# Patient Record
Sex: MALE | Race: ASIAN | NOT HISPANIC OR LATINO | Employment: OTHER | URBAN - METROPOLITAN AREA
[De-identification: names, ages, dates, MRNs, and addresses within clinical notes are randomized per-mention and may not be internally consistent; named-entity substitution may affect disease eponyms.]

---

## 2020-02-05 ENCOUNTER — OFFICE VISIT - HEALTHEAST (OUTPATIENT)
Dept: FAMILY MEDICINE | Facility: CLINIC | Age: 30
End: 2020-02-05

## 2020-02-05 DIAGNOSIS — J11.1 INFLUENZA-LIKE ILLNESS: ICD-10-CM

## 2020-02-05 LAB
DEPRECATED S PYO AG THROAT QL EIA: NORMAL
FLUAV AG SPEC QL IA: NORMAL
FLUBV AG SPEC QL IA: NORMAL

## 2020-02-06 LAB — GROUP A STREP BY PCR: NORMAL

## 2020-02-24 ENCOUNTER — OFFICE VISIT - HEALTHEAST (OUTPATIENT)
Dept: FAMILY MEDICINE | Facility: CLINIC | Age: 30
End: 2020-02-24

## 2020-02-24 DIAGNOSIS — Z00.00 ROUTINE GENERAL MEDICAL EXAMINATION AT A HEALTH CARE FACILITY: ICD-10-CM

## 2020-02-24 DIAGNOSIS — Z11.59 SCREENING FOR VIRAL DISEASE: ICD-10-CM

## 2020-02-24 DIAGNOSIS — H54.3 DECREASED VISION IN BOTH EYES: ICD-10-CM

## 2020-02-24 DIAGNOSIS — Z23 IMMUNIZATION DUE: ICD-10-CM

## 2020-02-24 DIAGNOSIS — B07.9 VIRAL WARTS, UNSPECIFIED TYPE: ICD-10-CM

## 2020-02-24 DIAGNOSIS — H02.402 PTOSIS OF LEFT EYELID: ICD-10-CM

## 2020-02-24 LAB
ALBUMIN SERPL-MCNC: 4.5 G/DL (ref 3.5–5)
ALP SERPL-CCNC: 85 U/L (ref 45–120)
ALT SERPL W P-5'-P-CCNC: 53 U/L (ref 0–45)
ANION GAP SERPL CALCULATED.3IONS-SCNC: 12 MMOL/L (ref 5–18)
AST SERPL W P-5'-P-CCNC: 30 U/L (ref 0–40)
BILIRUB SERPL-MCNC: 0.6 MG/DL (ref 0–1)
BUN SERPL-MCNC: 18 MG/DL (ref 8–22)
CALCIUM SERPL-MCNC: 9.6 MG/DL (ref 8.5–10.5)
CHLORIDE BLD-SCNC: 102 MMOL/L (ref 98–107)
CHOLEST SERPL-MCNC: 161 MG/DL
CO2 SERPL-SCNC: 26 MMOL/L (ref 22–31)
CREAT SERPL-MCNC: 0.89 MG/DL (ref 0.7–1.3)
ERYTHROCYTE [DISTWIDTH] IN BLOOD BY AUTOMATED COUNT: 12 % (ref 11–14.5)
FASTING STATUS PATIENT QL REPORTED: NO
GFR SERPL CREATININE-BSD FRML MDRD: >60 ML/MIN/1.73M2
GLUCOSE BLD-MCNC: 96 MG/DL (ref 70–125)
HCT VFR BLD AUTO: 47.4 % (ref 40–54)
HDLC SERPL-MCNC: 44 MG/DL
HGB BLD-MCNC: 16.1 G/DL (ref 14–18)
HIV 1+2 AB+HIV1 P24 AG SERPL QL IA: NEGATIVE
LDLC SERPL CALC-MCNC: 96 MG/DL
MCH RBC QN AUTO: 29.2 PG (ref 27–34)
MCHC RBC AUTO-ENTMCNC: 34 G/DL (ref 32–36)
MCV RBC AUTO: 86 FL (ref 80–100)
PLATELET # BLD AUTO: 271 THOU/UL (ref 140–440)
PMV BLD AUTO: 8.2 FL (ref 7–10)
POTASSIUM BLD-SCNC: 4 MMOL/L (ref 3.5–5)
PROT SERPL-MCNC: 7.5 G/DL (ref 6–8)
RBC # BLD AUTO: 5.53 MILL/UL (ref 4.4–6.2)
SODIUM SERPL-SCNC: 140 MMOL/L (ref 136–145)
TRIGL SERPL-MCNC: 103 MG/DL
TSH SERPL DL<=0.005 MIU/L-ACNC: 1 UIU/ML (ref 0.3–5)
WBC: 6.5 THOU/UL (ref 4–11)

## 2020-02-24 ASSESSMENT — MIFFLIN-ST. JEOR: SCORE: 1687.57

## 2020-02-27 ENCOUNTER — COMMUNICATION - HEALTHEAST (OUTPATIENT)
Dept: FAMILY MEDICINE | Facility: CLINIC | Age: 30
End: 2020-02-27

## 2020-05-27 ENCOUNTER — OFFICE VISIT - HEALTHEAST (OUTPATIENT)
Dept: FAMILY MEDICINE | Facility: CLINIC | Age: 30
End: 2020-05-27

## 2020-05-27 DIAGNOSIS — R74.01 ELEVATED ALT MEASUREMENT: ICD-10-CM

## 2020-06-03 ENCOUNTER — AMBULATORY - HEALTHEAST (OUTPATIENT)
Dept: LAB | Facility: CLINIC | Age: 30
End: 2020-06-03

## 2020-06-03 DIAGNOSIS — R74.01 ELEVATED ALT MEASUREMENT: ICD-10-CM

## 2020-06-03 LAB
AFP SERPL-MCNC: 2.9 UG/ML
ALBUMIN SERPL-MCNC: 4.5 G/DL (ref 3.5–5)
ALP SERPL-CCNC: 76 U/L (ref 45–120)
ALT SERPL W P-5'-P-CCNC: 36 U/L (ref 0–45)
AST SERPL W P-5'-P-CCNC: 27 U/L (ref 0–40)
BILIRUB DIRECT SERPL-MCNC: 0.2 MG/DL
BILIRUB SERPL-MCNC: 0.6 MG/DL (ref 0–1)
FERRITIN SERPL-MCNC: 350 NG/ML (ref 27–300)
PROT SERPL-MCNC: 7.9 G/DL (ref 6–8)

## 2020-06-04 ENCOUNTER — COMMUNICATION - HEALTHEAST (OUTPATIENT)
Dept: FAMILY MEDICINE | Facility: CLINIC | Age: 30
End: 2020-06-04

## 2020-06-04 LAB
HBV SURFACE AG SERPL QL IA: NEGATIVE
HCV AB SERPL QL IA: NEGATIVE

## 2021-03-23 ENCOUNTER — OFFICE VISIT - HEALTHEAST (OUTPATIENT)
Dept: FAMILY MEDICINE | Facility: CLINIC | Age: 31
End: 2021-03-23

## 2021-03-23 DIAGNOSIS — T21.11XA SUPERFICIAL BURN OF CHEST WALL, INITIAL ENCOUNTER: ICD-10-CM

## 2021-03-23 DIAGNOSIS — T22.112A SUPERFICIAL BURN OF LEFT FOREARM, INITIAL ENCOUNTER: ICD-10-CM

## 2021-03-23 DIAGNOSIS — T21.12XA SUPERFICIAL BURN OF ABDOMINAL WALL, INITIAL ENCOUNTER: ICD-10-CM

## 2021-03-23 DIAGNOSIS — T22.191A: ICD-10-CM

## 2021-03-23 DIAGNOSIS — T21.22XA BURN OF SECOND DEGREE OF ABDOMINAL WALL, INITIAL ENCOUNTER: ICD-10-CM

## 2021-03-23 RX ORDER — IBUPROFEN 600 MG/1
TABLET, FILM COATED ORAL
Status: SHIPPED | COMMUNITY
Start: 2020-07-15 | End: 2022-07-27

## 2021-05-27 VITALS
HEART RATE: 81 BPM | DIASTOLIC BLOOD PRESSURE: 79 MMHG | TEMPERATURE: 98.4 F | SYSTOLIC BLOOD PRESSURE: 124 MMHG | RESPIRATION RATE: 12 BRPM | OXYGEN SATURATION: 98 %

## 2021-06-04 VITALS
HEART RATE: 72 BPM | DIASTOLIC BLOOD PRESSURE: 70 MMHG | WEIGHT: 158.5 LBS | SYSTOLIC BLOOD PRESSURE: 115 MMHG | TEMPERATURE: 98.8 F | OXYGEN SATURATION: 99 %

## 2021-06-04 VITALS
HEIGHT: 70 IN | BODY MASS INDEX: 23.05 KG/M2 | WEIGHT: 161 LBS | HEART RATE: 73 BPM | OXYGEN SATURATION: 99 % | SYSTOLIC BLOOD PRESSURE: 116 MMHG | DIASTOLIC BLOOD PRESSURE: 72 MMHG | TEMPERATURE: 97.9 F

## 2021-06-05 NOTE — PROGRESS NOTES
Assessment & Plan:       1. Influenza-like illness  Rapid Strep A Screen-Throat    Group A Strep, RNA Direct Detection, Throat    Influenza A/B Rapid Test     Medical Decision Making  Patient presents with acute onset cough and fevers most consistent with an influenza-like illness.  Tested positive for both influenza and strep throat at this time.  He otherwise has no signs of pneumonia on exam.  Patient is not in significant respiratory distress and is tolerating fluids appropriate.  Recommended conservative management of over-the-counter analgesics, rest, fluids, and honey.  Did not recommend inhaler given that he denies shortness of breath, and did not recommend oral antivirals given duration over 48 hours. Discussed signs of worsening symptoms and when to follow-up with PCP if no symptom improvement.    Patient Instructions   You were seen today for flu-like symptoms. Although the rapid influenza test was negative, this is still most likely due to a virus. You are contagious until your fever is gone for 24 hours. Maintain good hand hygiene, cover your cough, and limit contact to prevent spreading the illness. Symptoms typically last 1-2 weeks.    Symptom management:  - Drink plenty of fluids and allow for plenty of rest  - Use tylenol or ibuprofen every 6-8 hours as needed for fever/discomfort  - Recommend honey to help with sore throat and cough    Reasons to be seen immediately for re-evaluation:  - Have trouble breathing or are short of breath  - Feel pain or pressure in your chest or belly  - Get suddenly dizzy  - Feel confused  - Have severe vomiting    If no symptom improvement in 1 week, follow-up with your primary care provider.            Subjective:       Eli Caballero is a 29 y.o. male here for evaluation of cough and fevers.  Onset of symptoms was 3 days ago with no improvement since then.  Associated symptoms include fevers, body aches, and headache.  Patient denies shortness of breath.  No history  of asthma or smoking.  Patient did not get a flu shot this year.  No recent travel.    The following portions of the patient's history were reviewed and updated as appropriate: allergies, current medications and problem list.    Review of Systems  Pertinent items are noted in HPI.     Allergies  Allergies not on file    No family history on file.    Social History     Socioeconomic History     Marital status:      Spouse name: None     Number of children: None     Years of education: None     Highest education level: None   Occupational History     None   Social Needs     Financial resource strain: None     Food insecurity:     Worry: None     Inability: None     Transportation needs:     Medical: None     Non-medical: None   Tobacco Use     Smoking status: Never Smoker     Smokeless tobacco: Never Used   Substance and Sexual Activity     Alcohol use: None     Drug use: None     Sexual activity: None   Lifestyle     Physical activity:     Days per week: None     Minutes per session: None     Stress: None   Relationships     Social connections:     Talks on phone: None     Gets together: None     Attends Scientologist service: None     Active member of club or organization: None     Attends meetings of clubs or organizations: None     Relationship status: None     Intimate partner violence:     Fear of current or ex partner: None     Emotionally abused: None     Physically abused: None     Forced sexual activity: None   Other Topics Concern     None   Social History Narrative     None         Objective:       /70   Pulse 72   Temp 98.8  F (37.1  C) (Oral)   Wt 158 lb 8 oz (71.9 kg)   SpO2 99%   General appearance: alert, appears stated age, cooperative, no distress and non-toxic  Head: Normocephalic, without obvious abnormality, atraumatic  Ears: normal TM's and external ear canals both ears  Nose: no discharge  Throat: lips, mucosa, and tongue normal; teeth and gums normal  Neck: no adenopathy and  supple, symmetrical, trachea midline  Lungs: clear to auscultation bilaterally  Heart: regular rate and rhythm, S1, S2 normal, no murmur, click, rub or gallop     Lab Results    Recent Results (from the past 24 hour(s))   Rapid Strep A Screen-Throat   Result Value Ref Range    Rapid Strep A Antigen No Group A Strep detected, presumptive negative No Group A Strep detected, presumptive negative   Influenza A/B Rapid Test   Result Value Ref Range    Influenza  A, Rapid Antigen No Influenza A antigen detected No Influenza A antigen detected    Influenza B, Rapid Antigen No Influenza B antigen detected No Influenza B antigen detected     I personally reviewed these results and discussed findings with the patient.

## 2021-06-05 NOTE — PATIENT INSTRUCTIONS - HE
You were seen today for flu-like symptoms. Although the rapid influenza test was negative, this is still most likely due to a virus. You are contagious until your fever is gone for 24 hours. Maintain good hand hygiene, cover your cough, and limit contact to prevent spreading the illness. Symptoms typically last 1-2 weeks.    Symptom management:  - Drink plenty of fluids and allow for plenty of rest  - Use tylenol or ibuprofen every 6-8 hours as needed for fever/discomfort  - Recommend honey to help with sore throat and cough    Reasons to be seen immediately for re-evaluation:  - Have trouble breathing or are short of breath  - Feel pain or pressure in your chest or belly  - Get suddenly dizzy  - Feel confused  - Have severe vomiting    If no symptom improvement in 1 week, follow-up with your primary care provider.

## 2021-06-06 NOTE — TELEPHONE ENCOUNTER
"Called pt with [facility]  [facility] Manuel:  ID: [id number]. LVM stating to call back clinic.\"Okay to relay message\"    "

## 2021-06-06 NOTE — TELEPHONE ENCOUNTER
Who is calling:  Patient's wife, Vi  Reason for Call:    Returning call.  Relayed below message to patient's wife.  Patient's wife had no additional questions.  Transferred caller to scheduling.  Date of last appointment with primary care: N/A  Okay to leave a detailed message: No return call needed.

## 2021-06-06 NOTE — TELEPHONE ENCOUNTER
----- Message from Diego Hale MD sent at 2/27/2020  1:05 PM CST -----  Please contact this patient, let him know the labs look good no sign of infection the thyroid was normal the hemoglobin and white count normal    Blood sugar and kidney tests were normal.  Cholesterol levels look good with a total cholesterol 161, bad cholesterol only 96    1 liver test was slightly elevated.  I like to see him back in 3 months and recheck on that

## 2021-06-06 NOTE — PROGRESS NOTES
"Subjective: This patient comes in for evaluation is 29-year-old male.  Patient is at the clinic for the first time    Came [facility] in 2018 from [place]    Patient had evaluation with strep negative flu negative back on 2/5/2020    Past history no surgeries hospitalizations or medical problems.    Family history was noncontributory    Patient no allergies not taken any medicines.    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    No additional concerns other than on his hand he is got multiple flat warts on the left thumb and right middle finger will have him see dermatology.    He said decreased vision with visual acuity but also left upper eyelid ptosis he said that as long as he remember he states.        Tobacco status: He  reports that he has never smoked. He has never used smokeless tobacco.    There are no active problems to display for this patient.      No current outpatient medications on file.     No current facility-administered medications for this visit.        ROS: In point review of systems positive as outlined above otherwise negative    Objective:    /72 (Patient Site: Left Arm, Patient Position: Sitting, Cuff Size: Adult Regular)   Pulse 73   Temp 97.9  F (36.6  C) (Oral)   Ht 5' 9.75\" (1.772 m)   Wt 161 lb (73 kg)   SpO2 99%   BMI 23.27 kg/m    Body mass index is 23.27 kg/m .      General appearance no acute distress    HEENT left upper eyelid ptosis    Oropharynx is clear pupils react normally extraocular movements are full canals and TMs normal    Neck without bruit or thyroid fullness    Lungs are clear throughout no rales or rhonchi O2 sat 99%    Heart was regular rate in the 80s no murmur    Normal descended testes no evidence of hernia    Lower extremities without edema no joint redness warmth or swelling    Left thumb and right middle finger as outlined regarding the skin lesions/warts    Labs HIV negative CMP showed slightly elevated " liver tests at 53 normal CBC, normal cholesterol panel and thyroid normal    Results for orders placed or performed in visit on 02/24/20   HIV Antigen/Antibody Screening Cascade   Result Value Ref Range    HIV Antigen / Antibody Negative Negative   Comprehensive Metabolic Panel   Result Value Ref Range    Sodium 140 136 - 145 mmol/L    Potassium 4.0 3.5 - 5.0 mmol/L    Chloride 102 98 - 107 mmol/L    CO2 26 22 - 31 mmol/L    Anion Gap, Calculation 12 5 - 18 mmol/L    Glucose 96 70 - 125 mg/dL    BUN 18 8 - 22 mg/dL    Creatinine 0.89 0.70 - 1.30 mg/dL    GFR MDRD Af Amer >60 >60 mL/min/1.73m2    GFR MDRD Non Af Amer >60 >60 mL/min/1.73m2    Bilirubin, Total 0.6 0.0 - 1.0 mg/dL    Calcium 9.6 8.5 - 10.5 mg/dL    Protein, Total 7.5 6.0 - 8.0 g/dL    Albumin 4.5 3.5 - 5.0 g/dL    Alkaline Phosphatase 85 45 - 120 U/L    AST 30 0 - 40 U/L    ALT 53 (H) 0 - 45 U/L   Lipid Cascade RANDOM   Result Value Ref Range    Cholesterol 161 <=199 mg/dL    Triglycerides 103 <=149 mg/dL    HDL Cholesterol 44 >=40 mg/dL    LDL Calculated 96 <=129 mg/dL    Patient Fasting > 8hrs? No    Thyroid Stimulating Hormone (TSH)   Result Value Ref Range    TSH 1.00 0.30 - 5.00 uIU/mL   HM2(CBC w/o Differential)   Result Value Ref Range    WBC 6.5 4.0 - 11.0 thou/uL    RBC 5.53 4.40 - 6.20 mill/uL    Hemoglobin 16.1 14.0 - 18.0 g/dL    Hematocrit 47.4 40.0 - 54.0 %    MCV 86 80 - 100 fL    MCH 29.2 27.0 - 34.0 pg    MCHC 34.0 32.0 - 36.0 g/dL    RDW 12.0 11.0 - 14.5 %    Platelets 271 140 - 440 thou/uL    MPV 8.2 7.0 - 10.0 fL       Assessment:  1. Routine general medical examination at a health care facility  Comprehensive Metabolic Panel    Lipid Cascade RANDOM    Thyroid Stimulating Hormone (TSH)    HM2(CBC w/o Differential)   2. Immunization due  Tdap vaccine,  6yo or older,  IM    Influenza, Seasonal Quad, PF =/> 6months   3. Screening for viral disease  HIV Antigen/Antibody Screening Cascade   4. Viral warts, unspecified type  Ambulatory  referral to Dermatology   5. Ptosis of left eyelid     6. Decreased vision in both eyes  Ambulatory referral to Ophthalmology     Physical new patient, stable.  Referrals to dermatology and ophthalmology as outlined    Lab work were negative except for slight elevated liver test recheck that in 3 months.    Tdap and flu shot also given    Plan: As outlined above    This transcription uses voice recognition software, which may contain typographical errors.

## 2021-06-08 NOTE — TELEPHONE ENCOUNTER
----- Message from Diego Hale MD sent at 6/4/2020  1:43 PM CDT -----  Please contact this patient, good news all the test came back normal.  No evidence of any hepatitis or liver cancer etc.    The liver tests themselves were actually better to now back into the normal range.    No further follow-up is indicated

## 2021-06-08 NOTE — PROGRESS NOTES
"Eli Caballero is a 30 y.o. male who is being evaluated via a billable telephone visit.      The patient has been notified of following:     \"This telephone visit will be conducted via a call between you and your physician/provider. We have found that certain health care needs can be provided without the need for a physical exam.  This service lets us provide the care you need with a short phone conversation.  If a prescription is necessary we can send it directly to your pharmacy.  If lab work is needed we can place an order for that and you can then stop by our lab to have the test done at a later time.    Telephone visits are billed at different rates depending on your insurance coverage. During this emergency period, for some insurers they may be billed the same as an in-person visit.  Please reach out to your insurance provider with any questions.    If during the course of the call the physician/provider feels a telephone visit is not appropriate, you will not be charged for this service.\"    Patient has given verbal consent to a Telephone visit? Yes    What phone number would you like to be contacted at? [phone]        Additional provider notes:     No active covid sx or exposure    Had px 2/2020   Reviewed results   Alt 53 [id number]    Discussed w/u for elevated lft see below   Lab only    etoh minimal  1x per 1-2 weeks    My need liver us also    10 pt ros pos as above otherwise neg    Assessment/Plan:  1. Elevated ALT measurement  Hepatic Profile    AFP-Tumor Marker    Hepatitis B Surface Antigen (HBsAG)    Hepatitis C Antibody (Anti-HCV)    Ferritin     Await labs   Contact patient w f/u    Phone call duration: 11 minutes  1:56 through 2:07 pm    Diego Hale MD  "

## 2021-06-08 NOTE — TELEPHONE ENCOUNTER
Called and spoke with Eli Caballero , Message was given, Eli Caballero  understood, no further questions.

## 2021-06-16 NOTE — PATIENT INSTRUCTIONS - HE
-Keep the area that has blistered clean with soap & water twice daily, then apply antibiotic ointment (Bacitracin or Neosporin) and a bandage until it has healed    -For pain relief: may take ibuprofen 400-600 mg every 6 hours. May also try cool Aloe Vera gel    -Monitor for signs of infection such as redness, swelling, increased pain, or pus

## 2021-06-27 ENCOUNTER — HEALTH MAINTENANCE LETTER (OUTPATIENT)
Age: 31
End: 2021-06-27
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
## 2021-07-01 ENCOUNTER — OFFICE VISIT - HEALTHEAST (OUTPATIENT)
Dept: FAMILY MEDICINE | Facility: CLINIC | Age: 31
End: 2021-07-01

## 2021-07-01 ENCOUNTER — AMBULATORY - HEALTHEAST (OUTPATIENT)
Dept: LAB | Facility: CLINIC | Age: 31
End: 2021-07-01

## 2021-07-01 DIAGNOSIS — Z00.00 ROUTINE GENERAL MEDICAL EXAMINATION AT A HEALTH CARE FACILITY: ICD-10-CM

## 2021-07-01 DIAGNOSIS — H60.91 OTITIS EXTERNA OF RIGHT EAR, UNSPECIFIED CHRONICITY, UNSPECIFIED TYPE: ICD-10-CM

## 2021-07-01 DIAGNOSIS — R74.01 ELEVATED ALT MEASUREMENT: ICD-10-CM

## 2021-07-01 DIAGNOSIS — K21.00 GASTROESOPHAGEAL REFLUX DISEASE WITH ESOPHAGITIS, UNSPECIFIED WHETHER HEMORRHAGE: ICD-10-CM

## 2021-07-01 LAB
ALBUMIN SERPL-MCNC: 4.2 G/DL (ref 3.5–5)
ALP SERPL-CCNC: 79 U/L (ref 45–120)
ALT SERPL W P-5'-P-CCNC: 50 U/L (ref 0–45)
ANION GAP SERPL CALCULATED.3IONS-SCNC: 12 MMOL/L (ref 5–18)
AST SERPL W P-5'-P-CCNC: 34 U/L (ref 0–40)
BILIRUB SERPL-MCNC: 0.4 MG/DL (ref 0–1)
BUN SERPL-MCNC: 18 MG/DL (ref 8–22)
CALCIUM SERPL-MCNC: 9.2 MG/DL (ref 8.5–10.5)
CHLORIDE BLD-SCNC: 104 MMOL/L (ref 98–107)
CHOLEST SERPL-MCNC: 173 MG/DL
CO2 SERPL-SCNC: 26 MMOL/L (ref 22–31)
CREAT SERPL-MCNC: 1.02 MG/DL (ref 0.7–1.3)
ERYTHROCYTE [DISTWIDTH] IN BLOOD BY AUTOMATED COUNT: 11.9 % (ref 11–14.5)
FASTING STATUS PATIENT QL REPORTED: NO
GFR SERPL CREATININE-BSD FRML MDRD: >60 ML/MIN/1.73M2
GLUCOSE BLD-MCNC: 84 MG/DL (ref 70–125)
HCT VFR BLD AUTO: 47.1 % (ref 40–54)
HDLC SERPL-MCNC: 42 MG/DL
HGB BLD-MCNC: 15.9 G/DL (ref 14–18)
LDLC SERPL CALC-MCNC: 100 MG/DL
MCH RBC QN AUTO: 28.6 PG (ref 27–34)
MCHC RBC AUTO-ENTMCNC: 33.8 G/DL (ref 32–36)
MCV RBC AUTO: 85 FL (ref 80–100)
PLATELET # BLD AUTO: 233 THOU/UL (ref 140–440)
PMV BLD AUTO: 9.6 FL (ref 7–10)
POTASSIUM BLD-SCNC: 3.9 MMOL/L (ref 3.5–5)
PROT SERPL-MCNC: 7 G/DL (ref 6–8)
RBC # BLD AUTO: 5.55 MILL/UL (ref 4.4–6.2)
SODIUM SERPL-SCNC: 142 MMOL/L (ref 136–145)
TRIGL SERPL-MCNC: 155 MG/DL
WBC: 7.8 THOU/UL (ref 4–11)

## 2021-07-01 RX ORDER — PANTOPRAZOLE SODIUM 20 MG/1
20 TABLET, DELAYED RELEASE ORAL DAILY
Qty: 30 TABLET | Refills: 1 | Status: SHIPPED | OUTPATIENT
Start: 2021-07-01 | End: 2022-07-27

## 2021-07-01 ASSESSMENT — MIFFLIN-ST. JEOR: SCORE: 1736.44

## 2021-07-04 NOTE — PROGRESS NOTES
Progress Notes by Diego Hale MD at 7/1/2021  3:40 PM     Author: Diego Hale MD Service: -- Author Type: Physician    Filed: 7/4/2021  4:11 PM Encounter Date: 7/1/2021 Status: Signed    : Diego Hale MD (Physician)           Assessment & Plan     Eli was seen today for annual exam and heartburn.    Diagnoses and all orders for this visit:    Routine general medical examination at a health care facility  -     Comprehensive Metabolic Panel  -     Lipid Cascade FASTING  -     HM2(CBC w/o Differential)    Elevated ALT measurement  -     Comprehensive Metabolic Panel    Gastroesophageal reflux disease with esophagitis, unspecified whether hemorrhage  -     H. pylori Antigen, Stool(HPSAG); Future  -     pantoprazole (PROTONIX) 20 MG tablet; Take 1 tablet (20 mg total) by mouth daily.    Otitis externa of right ear, unspecified chronicity, unspecified type  -     neomycin-polymyxin-hydrocortisone (CORTISPORIN) otic solution; Administer 3 drops to the right ear 3 (three) times a day for 10 days.          Physical stable    Previous elevated ALT will check liver function test as part of a CMP.    Additional labs of CBC and lipid pending.    GERD we will treat with Protonix and check H. pylori stool antigen    Otitis externa on the right treat with amoxicillin CBC 3 drops to the right ear 3 times daily for 10 days avoid Q-tips.    Follow-up as needed patient contacted with results          Return in about 1 year (around 7/1/2022) for Annual physical.    Diego Hale MD  [facility]    Subjective   Eli Caballero is 31 y.o. and presents today for the following health issues   HPI   This patient comes in for physical.  He had physical back in February 2020 ALT was elevated at that time I did check labs in June for follow-up and he had normal liver function 9.  Hepatitis B hepatitis C negative ferritin AFP normal.    Patient will get labs checked, CMP CBC lipid and will be  "contacted with your result    Patient has some discomfort in the right ear.  He has evidence of otitis externa he does use Q-tips labs not diagnosed a neomycin to be seen.    Patient also has some reflux symptoms with some slight waterbrash.  Some epigastric discomfort at times    Discussed options we will try Protonix, but restricted H. pylori stool antigen.  I did give a prescription.    No additional concern or issue        Review of Systems  10 point incidental above otherwise negative      Objective    BP 98/56 (Patient Site: Left Arm, Patient Position: Sitting, Cuff Size: Adult Large)   Pulse 80   Temp 97.7  F (36.5  C) (Oral)   Resp 14   Ht 5' 9.69\" (1.77 m)   Wt 172 lb (78 kg)   BMI 24.90 kg/m    Body mass index is 24.9 kg/m .  Physical Exam  General appearance no acute distress.    HEENT: Right canal with some irritation little discomfort moving the eardrum, TMs normal    Left canal TM normal.    Oropharynx is clear    Eyes without scleral icterus    Lungs are clear no asthma, heart regular S1-2 normal.  No murmur    Abdomen supple sounds normal no tenderness    Extremities without edema.    Nondistended testes no evidence of hernia    Joints are normal no redness warmth or swelling.    Labs no CBC lipid CMP pending also patient will bring in stool sample for H. pylori antigen                           "

## 2021-07-05 PROBLEM — K21.00 GASTROESOPHAGEAL REFLUX DISEASE WITH ESOPHAGITIS, UNSPECIFIED WHETHER HEMORRHAGE: Status: ACTIVE | Noted: 2021-07-01

## 2021-07-05 PROBLEM — R74.01 ELEVATED ALT MEASUREMENT: Status: ACTIVE | Noted: 2021-07-01

## 2021-07-05 LAB — H PYLORI AG STL QL IA: NEGATIVE

## 2021-07-06 VITALS
WEIGHT: 172 LBS | HEIGHT: 70 IN | TEMPERATURE: 97.7 F | RESPIRATION RATE: 14 BRPM | BODY MASS INDEX: 24.62 KG/M2 | HEART RATE: 80 BPM | DIASTOLIC BLOOD PRESSURE: 56 MMHG | SYSTOLIC BLOOD PRESSURE: 98 MMHG

## 2021-07-07 ENCOUNTER — COMMUNICATION - HEALTHEAST (OUTPATIENT)
Dept: FAMILY MEDICINE | Facility: CLINIC | Age: 31
End: 2021-07-07

## 2021-07-07 NOTE — TELEPHONE ENCOUNTER
Telephone Encounter by Paras Bob MA at 7/7/2021  1:08 PM     Author: Paras Bob MA Service: -- Author Type: Medical Assistant    Filed: 7/7/2021  1:12 PM Encounter Date: 7/7/2021 Status: Addendum    : Paras Bob MA (Medical Assistant)    Related Notes: Original Note by Paras Bob MA (Medical Assistant) filed at 7/7/2021  1:08 PM       Pt informed of message about results. Use  line to contact patient : [phone]      ----- Message from Diego Hale MD sent at 7/6/2021  8:02 AM CDT -----  Please contact this patient, the H. pylori stool test came back negative.  Good news, there is no bacteria in the stomach causing an infection.

## 2021-07-07 NOTE — TELEPHONE ENCOUNTER
Telephone Encounter by Paras Bob MA at 7/7/2021  1:17 PM     Author: Paras Bob MA Service: -- Author Type: Medical Assistant    Filed: 7/7/2021  1:17 PM Encounter Date: 7/7/2021 Status: Addendum    : Paras Bob MA (Medical Assistant)    Related Notes: Original Note by Paras Bob MA (Medical Assistant) filed at 7/7/2021  1:17 PM       Pt informed of message. Use  line to contact patient :[phone]    ----- Message from Diego Hale MD sent at 7/4/2021  4:15 PM CDT -----  Please contact this patient, let her know that 1 liver test is elevated again just slightly.  And also the triglycerides were elevated.    This most likely is not from the carbohydrates in the diet.    He needs to decrease his intake of rice bread sugar Posta sweets alcohol desserts etc.    Follow-up in 3 to 4 months come in fasting we will recheck on the triglycerides and recheck liver function.  If it remains elevated we may need to get an ultrasound of the liver.    Please let him know that I will get back to him regarding the results of the stool sample for H. pylori after he brings that in, and the test comes back.

## 2021-10-17 ENCOUNTER — HEALTH MAINTENANCE LETTER (OUTPATIENT)
Age: 31
End: 2021-10-17

## 2022-07-27 ENCOUNTER — OFFICE VISIT (OUTPATIENT)
Dept: FAMILY MEDICINE | Facility: CLINIC | Age: 32
End: 2022-07-27
Payer: COMMERCIAL

## 2022-07-27 VITALS
HEIGHT: 70 IN | TEMPERATURE: 98 F | HEART RATE: 67 BPM | WEIGHT: 163 LBS | BODY MASS INDEX: 23.34 KG/M2 | DIASTOLIC BLOOD PRESSURE: 67 MMHG | RESPIRATION RATE: 16 BRPM | SYSTOLIC BLOOD PRESSURE: 105 MMHG

## 2022-07-27 DIAGNOSIS — Z00.00 ROUTINE GENERAL MEDICAL EXAMINATION AT A HEALTH CARE FACILITY: ICD-10-CM

## 2022-07-27 DIAGNOSIS — K21.00 GASTROESOPHAGEAL REFLUX DISEASE WITH ESOPHAGITIS, UNSPECIFIED WHETHER HEMORRHAGE: ICD-10-CM

## 2022-07-27 DIAGNOSIS — M54.50 ACUTE BILATERAL LOW BACK PAIN WITHOUT SCIATICA: Primary | ICD-10-CM

## 2022-07-27 DIAGNOSIS — Z13.220 SCREENING FOR HYPERLIPIDEMIA: ICD-10-CM

## 2022-07-27 DIAGNOSIS — R74.01 ELEVATED ALT MEASUREMENT: ICD-10-CM

## 2022-07-27 DIAGNOSIS — M54.2 CERVICALGIA: ICD-10-CM

## 2022-07-27 LAB
ALBUMIN SERPL BCG-MCNC: 5 G/DL (ref 3.5–5.2)
ALP SERPL-CCNC: 67 U/L (ref 40–129)
ALT SERPL W P-5'-P-CCNC: 67 U/L (ref 10–50)
ANION GAP SERPL CALCULATED.3IONS-SCNC: 8 MMOL/L (ref 7–15)
AST SERPL W P-5'-P-CCNC: 44 U/L (ref 10–50)
BILIRUB SERPL-MCNC: 0.6 MG/DL
BUN SERPL-MCNC: 18.1 MG/DL (ref 6–20)
CALCIUM SERPL-MCNC: 9.5 MG/DL (ref 8.6–10)
CHLORIDE SERPL-SCNC: 101 MMOL/L (ref 98–107)
CHOLEST SERPL-MCNC: 186 MG/DL
CREAT SERPL-MCNC: 0.86 MG/DL (ref 0.67–1.17)
DEPRECATED HCO3 PLAS-SCNC: 28 MMOL/L (ref 22–29)
GFR SERPL CREATININE-BSD FRML MDRD: >90 ML/MIN/1.73M2
GLUCOSE SERPL-MCNC: 88 MG/DL (ref 70–99)
HDLC SERPL-MCNC: 51 MG/DL
LDLC SERPL CALC-MCNC: 106 MG/DL
NONHDLC SERPL-MCNC: 135 MG/DL
POTASSIUM SERPL-SCNC: 4.1 MMOL/L (ref 3.4–5.3)
PROT SERPL-MCNC: 7.9 G/DL (ref 6.4–8.3)
SODIUM SERPL-SCNC: 137 MMOL/L (ref 136–145)
TRIGL SERPL-MCNC: 145 MG/DL

## 2022-07-27 PROCEDURE — 80061 LIPID PANEL: CPT | Performed by: FAMILY MEDICINE

## 2022-07-27 PROCEDURE — 99395 PREV VISIT EST AGE 18-39: CPT | Performed by: FAMILY MEDICINE

## 2022-07-27 PROCEDURE — 80053 COMPREHEN METABOLIC PANEL: CPT | Performed by: FAMILY MEDICINE

## 2022-07-27 PROCEDURE — 99213 OFFICE O/P EST LOW 20 MIN: CPT | Mod: 25 | Performed by: FAMILY MEDICINE

## 2022-07-27 PROCEDURE — 36415 COLL VENOUS BLD VENIPUNCTURE: CPT | Performed by: FAMILY MEDICINE

## 2022-07-27 RX ORDER — IBUPROFEN 600 MG/1
600 TABLET, FILM COATED ORAL EVERY 6 HOURS PRN
Qty: 30 TABLET | Refills: 0 | Status: SHIPPED | OUTPATIENT
Start: 2022-07-27

## 2022-07-27 RX ORDER — PANTOPRAZOLE SODIUM 20 MG/1
20 TABLET, DELAYED RELEASE ORAL DAILY
Qty: 90 TABLET | Refills: 1 | Status: SHIPPED | OUTPATIENT
Start: 2022-07-27 | End: 2023-09-19

## 2022-07-27 ASSESSMENT — ENCOUNTER SYMPTOMS
FEVER: 0
NERVOUS/ANXIOUS: 0
DIARRHEA: 0
DIZZINESS: 0
SHORTNESS OF BREATH: 0
PARESTHESIAS: 0
SORE THROAT: 0
NAUSEA: 0
CONSTIPATION: 0
DYSURIA: 0
HEARTBURN: 1
HEMATOCHEZIA: 0
JOINT SWELLING: 0
MYALGIAS: 0
WEAKNESS: 0
ABDOMINAL PAIN: 0
CHILLS: 0
FREQUENCY: 0
PALPITATIONS: 0
HEMATURIA: 0
EYE PAIN: 0
COUGH: 0
ARTHRALGIAS: 0
HEADACHES: 0

## 2022-07-27 NOTE — PROGRESS NOTES
SUBJECTIVE:   CC: Eli Caballero is an 32 year old male who presents for preventative health visit.     Patient has been advised of split billing requirements and indicates understanding: Yes  Healthy Habits:     Getting at least 3 servings of Calcium per day:  Yes    Bi-annual eye exam:  Yes    Dental care twice a year:  Yes    Sleep apnea or symptoms of sleep apnea:  None    Diet:  Regular (no restrictions)    Frequency of exercise:  2-3 days/week    Duration of exercise:  Less than 15 minutes    Taking medications regularly:  Yes    Barriers to taking medications:  Not applicable    Medication side effects:  None    PHQ-2 Total Score: 0    Additional concerns today:  No    Concerns of intermittent back and neck pain.  Patient works as a .  Seems to get worse if he is carrying anything of significant weight of 20 to 30 pounds.  Has pain in low back and neck.  Tylenol and Bengay have been helpful to a certain extent.  Denies radiation to arms or legs.  No fever or chills.  No history of cancer.  No weakness numbness or tingling.  Can radiate into chest sometimes as well.    Patient also with concerns of difficulty starting urinary stream.  This only happens in a public restroom context.  Feels urge but cannot start the stream.  Denies dysuria or urinary frequency.    Patient with history of GERD.  Seems to be worse if he takes a sour energy drink.  These have high caffeine content and can have carbonation as well as citrus acidity.  Had PPI.  It helped somewhat.  H. pylori stool antigen negative last year.    Today's PHQ-2 Score:   PHQ-2 ( 1999 Pfizer) 7/27/2022   Q1: Little interest or pleasure in doing things 0   Q2: Feeling down, depressed or hopeless 0   PHQ-2 Score 0   Q1: Little interest or pleasure in doing things Not at all   Q2: Feeling down, depressed or hopeless Not at all   PHQ-2 Score 0       Abuse: Current or Past(Physical, Sexual or Emotional)- No  Do you feel safe in your  environment? Yes    Have you ever done Advance Care Planning? (For example, a Health Directive, POLST, or a discussion with a medical provider or your loved ones about your wishes): No, advance care planning information given to patient to review.  Patient declined advance care planning discussion at this time.    Social History     Tobacco Use     Smoking status: Never Smoker     Smokeless tobacco: Never Used   Substance Use Topics     Alcohol use: Not on file     If you drink alcohol do you typically have >3 drinks per day or >7 drinks per week? No    Alcohol Use 7/27/2022   Prescreen: >3 drinks/day or >7 drinks/week? No   Prescreen: >3 drinks/day or >7 drinks/week? -     Last PSA: No results found for: PSA  BP Readings from Last 3 Encounters:   07/27/22 105/67   07/01/21 98/56   03/23/21 124/79    Wt Readings from Last 3 Encounters:   07/27/22 73.9 kg (163 lb)   07/01/21 78 kg (172 lb)   02/24/20 73 kg (161 lb)                  Patient Active Problem List   Diagnosis     Gastroesophageal reflux disease with esophagitis, unspecified whether hemorrhage     Elevated ALT measurement     History reviewed. No pertinent surgical history.    Social History     Tobacco Use     Smoking status: Never Smoker     Smokeless tobacco: Never Used   Substance Use Topics     Alcohol use: Not on file     History reviewed. No pertinent family history.      Current Outpatient Medications   Medication Sig Dispense Refill     ibuprofen (ADVIL/MOTRIN) 600 MG tablet Take 1 tablet (600 mg) by mouth every 6 hours as needed for moderate pain 30 tablet 0     pantoprazole (PROTONIX) 20 MG EC tablet Take 1 tablet (20 mg) by mouth daily 90 tablet 1     No Known Allergies  Recent Labs   Lab Test 07/01/21  1608 06/03/20  1017 02/24/20  1404     --  96   HDL 42  --  44   TRIG 155*  --  103   ALT 50* 36 53*   CR 1.02  --  0.89   GFRESTIMATED >60  --  >60   GFRESTBLACK >60  --  >60   POTASSIUM 3.9  --  4.0   TSH  --   --  1.00        Reviewed  "and updated as needed this visit by clinical staff   Tobacco  Allergies  Meds  Problems  Med Hx  Surg Hx  Fam Hx            Reviewed and updated as needed this visit by Provider   Tobacco  Allergies  Meds  Problems  Med Hx  Surg Hx  Fam Hx           History reviewed. No pertinent past medical history.   History reviewed. No pertinent surgical history.    Review of Systems  GEN: no fevers or chills   ENT: no sinus concerns, normal hearing and vision   RESP: no cough or wheezing   CV: no dyspnea, palpitations, edema or chest pain   GI: no nausea, vomiting, diarrhea, or constipation. Some GERD   : normal urination (see above)  ENDO: no hot or cold intolerance, no polydipsia or polyuria   MS: See above, otherwise, no myalgias or arthralgias   DERM: no rash or bruising   PSYCH: no depression concerns     OBJECTIVE:   /67 (BP Location: Left arm, Patient Position: Sitting, Cuff Size: Adult Large)   Pulse 67   Temp 98  F (36.7  C) (Temporal)   Resp 16   Ht 1.778 m (5' 10\")   Wt 73.9 kg (163 lb)   BMI 23.39 kg/m      Physical Exam  Gen:   Alert, not distressed  Head:   Normocephalic, without obvious abnormality, atraumatic  Eyes:   PERRL, conjunctiva/corneas clear, EOM's intact  Ears:   Normal tympanic membranes and external ear canals  Nose:    Mucosa normal, no drainage or sinus tenderness  Throat:    No erythema or exudates  Neck:    No adenopathy no nodules in thyroid, normal ROM  Lungs:    Clear to auscultation bilaterally, respirations unlabored  Chest wall:  No tenderness or deformity  Heart:     Regular, normal S1 and S2, no murmur, gallop or rub  Abdomen:  Soft, non-tender, normal bowel sounds, no masses, no organomegaly  Back:    Symmetric, no curvature, ROM normal, no CVA tenderness  Extremities:   Extremities normal, atraumatic, no cyanosis or edema  Skin:     Skin color, texture, turgor normal, no rashes or lesions  Lymph nodes:   Cervical and supraclavicular nodes " "normal  Neurologic:   CNII-XII intact.   DTRs normal and symmetric.  Symmetric strength and sensation.      Diagnostic Test Results:  Labs reviewed in Epic      ASSESSMENT/PLAN:   Eli was seen today for physical and establish care.    Diagnoses and all orders for this visit:    Acute bilateral low back pain without sciatica  Cervicalgia  No red flags.  Treat conservatively with NSAIDS and PT-     Physical Therapy Referral; Future  -     ibuprofen (ADVIL/MOTRIN) 600 MG tablet; Take 1 tablet (600 mg) by mouth every 6 hours as needed for moderate pain    Elevated ALT measurement  Recheck labs today. HCV and HBV negative before  -     Comprehensive metabolic panel (BMP + Alb, Alk Phos, ALT, AST, Total. Bili, TP)    Screening for hyperlipidemia  -     Lipid panel reflex to direct LDL Fasting    Gastroesophageal reflux disease with esophagitis, unspecified whether hemorrhage  Avoid provoking foods/drinks.  Careful with nsaids  -     pantoprazole (PROTONIX) 20 MG EC tablet; Take 1 tablet (20 mg) by mouth daily    Routine general medical examination at a health care facility    Other orders  -     REVIEW OF HEALTH MAINTENANCE PROTOCOL ORDERS        Patient has been advised of split billing requirements and indicates understanding: Yes    COUNSELING:   Reviewed preventive health counseling, as reflected in patient instructions       Regular exercise       Healthy diet/nutrition       Alcohol Use     Estimated body mass index is 23.39 kg/m  as calculated from the following:    Height as of this encounter: 1.778 m (5' 10\").    Weight as of this encounter: 73.9 kg (163 lb).         He reports that he has never smoked. He has never used smokeless tobacco.      Counseling Resources:  ATP IV Guidelines  Pooled Cohorts Equation Calculator  FRAX Risk Assessment  ICSI Preventive Guidelines  Dietary Guidelines for Americans, 2010  USDA's MyPlate  ASA Prophylaxis  Lung CA Screening    Saturnino Walker MD  [facility] [address]

## 2022-10-02 ENCOUNTER — HEALTH MAINTENANCE LETTER (OUTPATIENT)
Age: 32
End: 2022-10-02

## 2023-06-15 ENCOUNTER — OFFICE VISIT (OUTPATIENT)
Dept: FAMILY MEDICINE | Facility: CLINIC | Age: 33
End: 2023-06-15
Payer: COMMERCIAL

## 2023-06-15 VITALS
DIASTOLIC BLOOD PRESSURE: 70 MMHG | SYSTOLIC BLOOD PRESSURE: 118 MMHG | HEART RATE: 67 BPM | RESPIRATION RATE: 24 BRPM | BODY MASS INDEX: 23.13 KG/M2 | OXYGEN SATURATION: 99 % | WEIGHT: 161.6 LBS | HEIGHT: 70 IN | TEMPERATURE: 98.3 F

## 2023-06-15 DIAGNOSIS — H66.91 RIGHT ACUTE OTITIS MEDIA: Primary | ICD-10-CM

## 2023-06-15 PROCEDURE — 99213 OFFICE O/P EST LOW 20 MIN: CPT | Performed by: STUDENT IN AN ORGANIZED HEALTH CARE EDUCATION/TRAINING PROGRAM

## 2023-06-15 NOTE — PROGRESS NOTES
ASSESSMENT & PLAN:   Diagnoses and all orders for this visit:  Right acute otitis media  -     amoxicillin-clavulanate (AUGMENTIN) 875-125 MG tablet; Take 1 tablet by mouth 2 times daily for 7 days    Right AOM - Start Augmentin x7 days. OTC analgesics as needed for pain. Follow-up if not improving as expected.    No follow-ups on file.    There are no Patient Instructions on file for this visit.    At the end of the encounter, I discussed results, diagnosis, medications. Discussed red flags for immediate return to clinic/ER, as well as indications for follow up if no improvement. Patient and/or caregiver understood and agreed to plan. Patient was stable for discharge.    ------------------------------------------------------------------------  SUBJECTIVE  Patient presents with:  Otalgia: Has right side pain for last few days- drinking and eating makes pain worse     HPI  Eli Caballero is a(n) 33 year old male presenting to urgent care for right ear pain x5 days. Pain is worsened x2 days. Pain worse with swallowing and opening his mouth. No sore throat, fever, cough, congestion, rhinorrhea.    Review of Systems    Current Outpatient Medications   Medication Sig Dispense Refill     amoxicillin-clavulanate (AUGMENTIN) 875-125 MG tablet Take 1 tablet by mouth 2 times daily for 7 days 14 tablet 0     ibuprofen (ADVIL/MOTRIN) 600 MG tablet Take 1 tablet (600 mg) by mouth every 6 hours as needed for moderate pain 30 tablet 0     pantoprazole (PROTONIX) 20 MG EC tablet Take 1 tablet (20 mg) by mouth daily 90 tablet 1     Problem List:  2021-07: Gastroesophageal reflux disease with esophagitis,   unspecified whether hemorrhage  2021-07: Elevated ALT measurement    No Known Allergies      OBJECTIVE  Vitals:    06/15/23 1848   BP: 118/70   BP Location: Right arm   Patient Position: Sitting   Cuff Size: Adult Large   Pulse: 67   Resp: 24   Temp: 98.3  F (36.8  C)   TempSrc: Oral   SpO2: 99%   Weight: 73.3 kg (161 lb 9.6 oz)  "  Height: 1.778 m (5' 10\")     Physical Exam   GENERAL: healthy, alert, no acute distress.   HEAD: normocephalic, atraumatic.  EYE: PERRL. EOMs intact. No scleral injection bilaterally.   EAR: external ear normal. Bilateral ear canals normal and nonpainful. Right TM dull, bulging, erythematous. Left TM intact, pearly, translucent without bulging.  NOSE: external nose atraumatic without lesions.  OROPHARYNX: moist mucous membranes. Posterior oropharynx without erythema or exudate. Uvula midline. Patent airway.  LUNGS: no increased work of breathing. Clear lung sounds bilaterally. No wheezing, rhonchi, or rales.   CV: regular rate and rhythm. No clicks, murmurs, or rubs.    No results found for any visits on 06/15/23.  "

## 2023-09-19 ENCOUNTER — OFFICE VISIT (OUTPATIENT)
Dept: FAMILY MEDICINE | Facility: CLINIC | Age: 33
End: 2023-09-19
Payer: COMMERCIAL

## 2023-09-19 VITALS
DIASTOLIC BLOOD PRESSURE: 78 MMHG | HEART RATE: 65 BPM | BODY MASS INDEX: 22.9 KG/M2 | RESPIRATION RATE: 14 BRPM | HEIGHT: 70 IN | SYSTOLIC BLOOD PRESSURE: 119 MMHG | WEIGHT: 160 LBS | OXYGEN SATURATION: 99 % | TEMPERATURE: 97.5 F

## 2023-09-19 DIAGNOSIS — H69.91 DYSFUNCTION OF RIGHT EUSTACHIAN TUBE: ICD-10-CM

## 2023-09-19 DIAGNOSIS — K21.00 GASTROESOPHAGEAL REFLUX DISEASE WITH ESOPHAGITIS, UNSPECIFIED WHETHER HEMORRHAGE: ICD-10-CM

## 2023-09-19 DIAGNOSIS — Z00.00 ROUTINE GENERAL MEDICAL EXAMINATION AT A HEALTH CARE FACILITY: Primary | ICD-10-CM

## 2023-09-19 LAB
ALBUMIN SERPL BCG-MCNC: 4.8 G/DL (ref 3.5–5.2)
ALP SERPL-CCNC: 59 U/L (ref 40–129)
ALT SERPL W P-5'-P-CCNC: 48 U/L (ref 0–70)
ANION GAP SERPL CALCULATED.3IONS-SCNC: 10 MMOL/L (ref 7–15)
AST SERPL W P-5'-P-CCNC: 33 U/L (ref 0–45)
BILIRUB SERPL-MCNC: 0.6 MG/DL
BUN SERPL-MCNC: 16.5 MG/DL (ref 6–20)
CALCIUM SERPL-MCNC: 9.3 MG/DL (ref 8.6–10)
CHLORIDE SERPL-SCNC: 102 MMOL/L (ref 98–107)
CREAT SERPL-MCNC: 0.92 MG/DL (ref 0.67–1.17)
DEPRECATED HCO3 PLAS-SCNC: 28 MMOL/L (ref 22–29)
EGFRCR SERPLBLD CKD-EPI 2021: >90 ML/MIN/1.73M2
ERYTHROCYTE [DISTWIDTH] IN BLOOD BY AUTOMATED COUNT: 12.3 % (ref 10–15)
GLUCOSE SERPL-MCNC: 90 MG/DL (ref 70–99)
HCT VFR BLD AUTO: 47.5 % (ref 40–53)
HGB BLD-MCNC: 16 G/DL (ref 13.3–17.7)
MCH RBC QN AUTO: 28.9 PG (ref 26.5–33)
MCHC RBC AUTO-ENTMCNC: 33.7 G/DL (ref 31.5–36.5)
MCV RBC AUTO: 86 FL (ref 78–100)
PLATELET # BLD AUTO: 215 10E3/UL (ref 150–450)
POTASSIUM SERPL-SCNC: 4.4 MMOL/L (ref 3.4–5.3)
PROT SERPL-MCNC: 7.5 G/DL (ref 6.4–8.3)
RBC # BLD AUTO: 5.54 10E6/UL (ref 4.4–5.9)
SODIUM SERPL-SCNC: 140 MMOL/L (ref 136–145)
WBC # BLD AUTO: 5.7 10E3/UL (ref 4–11)

## 2023-09-19 PROCEDURE — 36415 COLL VENOUS BLD VENIPUNCTURE: CPT | Performed by: FAMILY MEDICINE

## 2023-09-19 PROCEDURE — 90746 HEPB VACCINE 3 DOSE ADULT IM: CPT | Performed by: FAMILY MEDICINE

## 2023-09-19 PROCEDURE — 80053 COMPREHEN METABOLIC PANEL: CPT | Performed by: FAMILY MEDICINE

## 2023-09-19 PROCEDURE — 90471 IMMUNIZATION ADMIN: CPT | Performed by: FAMILY MEDICINE

## 2023-09-19 PROCEDURE — 85027 COMPLETE CBC AUTOMATED: CPT | Performed by: FAMILY MEDICINE

## 2023-09-19 PROCEDURE — 99395 PREV VISIT EST AGE 18-39: CPT | Mod: 25 | Performed by: FAMILY MEDICINE

## 2023-09-19 PROCEDURE — 99213 OFFICE O/P EST LOW 20 MIN: CPT | Mod: 25 | Performed by: FAMILY MEDICINE

## 2023-09-19 RX ORDER — FLUTICASONE PROPIONATE 50 MCG
1 SPRAY, SUSPENSION (ML) NASAL DAILY
Qty: 16 G | Refills: 0 | Status: SHIPPED | OUTPATIENT
Start: 2023-09-19

## 2023-09-19 RX ORDER — PANTOPRAZOLE SODIUM 20 MG/1
20 TABLET, DELAYED RELEASE ORAL DAILY
Qty: 90 TABLET | Refills: 1 | Status: SHIPPED | OUTPATIENT
Start: 2023-09-19

## 2023-09-19 ASSESSMENT — ENCOUNTER SYMPTOMS
EYE PAIN: 0
FREQUENCY: 0
HEADACHES: 0
CONSTIPATION: 0
DIARRHEA: 0
JOINT SWELLING: 0
CHILLS: 0
HEARTBURN: 1
PARESTHESIAS: 0
ARTHRALGIAS: 0
PALPITATIONS: 0
ABDOMINAL PAIN: 0
HEMATURIA: 0
WEAKNESS: 0
SHORTNESS OF BREATH: 0
NAUSEA: 0
FEVER: 0
COUGH: 0
HEMATOCHEZIA: 0
MYALGIAS: 0
DIZZINESS: 0
SORE THROAT: 0
NERVOUS/ANXIOUS: 0
DYSURIA: 0

## 2023-09-19 NOTE — PROGRESS NOTES
SUBJECTIVE:   CC: Eli is an 33 year old who presents for preventative health visit.       9/19/2023     7:02 AM   Additional Questions   Roomed by Lori   Accompanied by SELF       Healthy Habits:     Getting at least 3 servings of Calcium per day:  Yes    Bi-annual eye exam:  Yes    Dental care twice a year:  Yes    Sleep apnea or symptoms of sleep apnea:  None    Diet:  Low fat/cholesterol    Frequency of exercise:  1 day/week    Duration of exercise:  N/A    Taking medications regularly:  Yes    Medication side effects:  None    Additional concerns today:  No    Today's PHQ-2 Score:       9/19/2023     6:59 AM   PHQ-2 ( 1999 Pfizer)   Q1: Little interest or pleasure in doing things 0   Q2: Feeling down, depressed or hopeless 0   PHQ-2 Score 0   Q1: Little interest or pleasure in doing things Not at all   Q2: Feeling down, depressed or hopeless Not at all   PHQ-2 Score 0       Has ear pain.  Treated for an ear infection Wendy 15 with Augmentin.  Med helpful.  Pain when opens mouth or talks loudly.  Some's pain still comes.  No discharge.  Hearing is within normal limits.  Sometimes trouble with chewing or taking hot food.    History of GERD.  Taking PPI intermittently.  Helps a lot.  Would like to continue.  No blood in stool.  No hematemesis.  No hemoptysis.  Minor epigastric pain intermittently.  Sometimes food related.      Social History     Tobacco Use    Smoking status: Never     Passive exposure: Never    Smokeless tobacco: Never   Substance Use Topics    Alcohol use: Not on file             9/19/2023     6:58 AM   Alcohol Use   Prescreen: >3 drinks/day or >7 drinks/week? No     BP Readings from Last 3 Encounters:   09/19/23 119/78   06/15/23 118/70   07/27/22 105/67    Wt Readings from Last 3 Encounters:   09/19/23 72.6 kg (160 lb)   06/15/23 73.3 kg (161 lb 9.6 oz)   07/27/22 73.9 kg (163 lb)            Patient Active Problem List   Diagnosis    Gastroesophageal reflux disease with esophagitis,  unspecified whether hemorrhage    Elevated ALT measurement     History reviewed. No pertinent surgical history.    Social History     Tobacco Use    Smoking status: Never     Passive exposure: Never    Smokeless tobacco: Never   Substance Use Topics    Alcohol use: Not on file     No family history on file.      Current Outpatient Medications   Medication Sig Dispense Refill    ibuprofen (ADVIL/MOTRIN) 600 MG tablet Take 1 tablet (600 mg) by mouth every 6 hours as needed for moderate pain 30 tablet 0    pantoprazole (PROTONIX) 20 MG EC tablet Take 1 tablet (20 mg) by mouth daily 90 tablet 1     No Known Allergies  Recent Labs   Lab Test 07/27/22  0834 07/01/21  1608 06/03/20  1017 02/24/20  1404   * 100  --  96   HDL 51 42  --  44   TRIG 145 155*  --  103   ALT 67* 50* 36 53*   CR 0.86 1.02  --  0.89   GFRESTIMATED >90 >60  --  >60   GFRESTBLACK  --  >60  --  >60   POTASSIUM 4.1 3.9  --  4.0   TSH  --   --   --  1.00        Reviewed and updated as needed this visit by clinical staff   Tobacco  Allergies  Meds   Med Hx  Surg Hx           Reviewed and updated as needed this visit by Provider       Med Hx  Surg Hx          History reviewed. No pertinent past medical history.   History reviewed. No pertinent surgical history.    Review of Systems   Constitutional:  Negative for chills and fever.   HENT:  Positive for ear pain. Negative for congestion, hearing loss and sore throat.    Eyes:  Negative for pain and visual disturbance.   Respiratory:  Negative for cough and shortness of breath.    Cardiovascular:  Negative for chest pain, palpitations and peripheral edema.   Gastrointestinal:  Positive for heartburn. Negative for abdominal pain, constipation, diarrhea, hematochezia and nausea.   Genitourinary:  Negative for dysuria, frequency, genital sores, hematuria, impotence, penile discharge and urgency.   Musculoskeletal:  Negative for arthralgias, joint swelling and myalgias.   Skin:  Negative for rash.  "  Neurological:  Negative for dizziness, weakness, headaches and paresthesias.   Psychiatric/Behavioral:  Negative for mood changes. The patient is not nervous/anxious.      OBJECTIVE:   /78 (BP Location: Left arm, Patient Position: Sitting, Cuff Size: Adult Regular)   Pulse 65   Temp 97.5  F (36.4  C) (Temporal)   Resp 14   Ht 1.778 m (5' 10\")   Wt 72.6 kg (160 lb)   SpO2 99%   BMI 22.96 kg/m      Physical Exam  Gen:   Alert, not distressed  Head:   Normocephalic, without obvious abnormality, atraumatic  Eyes:   PERRL, conjunctiva/corneas clear, EOM's intact  Ears:   Normal tympanic membranes and external ear canals  Nose:    Mucosa normal, no drainage or sinus tenderness  Throat:    No erythema or exudates  Neck:    No adenopathy no nodules in thyroid, normal ROM  Lungs:    Clear to auscultation bilaterally, respirations unlabored  Chest wall:  No tenderness or deformity  Heart:     Regular, normal S1 and S2, no murmur, gallop or rub  Abdomen:  Soft, non-tender, normal bowel sounds, no masses, no organomegaly  Back:    Symmetric, no curvature, ROM normal, no CVA tenderness  Extremities:   Extremities normal, atraumatic, no cyanosis or edema  Skin:     Skin color, texture, turgor normal, no rashes or lesions  Lymph nodes:   Cervical and supraclavicular nodes normal  Neurologic:   CNII-XII intact.   DTRs normal and symmetric.  Symmetric strength and sensation.      Diagnostic Test Results:  Labs reviewed in Epic  Results for orders placed or performed in visit on 09/19/23   Comprehensive metabolic panel (BMP + Alb, Alk Phos, ALT, AST, Total. Bili, TP)     Status: Normal   Result Value Ref Range    Sodium 140 136 - 145 mmol/L    Potassium 4.4 3.4 - 5.3 mmol/L    Chloride 102 98 - 107 mmol/L    Carbon Dioxide (CO2) 28 22 - 29 mmol/L    Anion Gap 10 7 - 15 mmol/L    Urea Nitrogen 16.5 6.0 - 20.0 mg/dL    Creatinine 0.92 0.67 - 1.17 mg/dL    Calcium 9.3 8.6 - 10.0 mg/dL    Glucose 90 70 - 99 mg/dL    " Alkaline Phosphatase 59 40 - 129 U/L    AST 33 0 - 45 U/L    ALT 48 0 - 70 U/L    Protein Total 7.5 6.4 - 8.3 g/dL    Albumin 4.8 3.5 - 5.2 g/dL    Bilirubin Total 0.6 <=1.2 mg/dL    GFR Estimate >90 >60 mL/min/1.73m2   CBC with platelets     Status: Normal   Result Value Ref Range    WBC Count 5.7 4.0 - 11.0 10e3/uL    RBC Count 5.54 4.40 - 5.90 10e6/uL    Hemoglobin 16.0 13.3 - 17.7 g/dL    Hematocrit 47.5 40.0 - 53.0 %    MCV 86 78 - 100 fL    MCH 28.9 26.5 - 33.0 pg    MCHC 33.7 31.5 - 36.5 g/dL    RDW 12.3 10.0 - 15.0 %    Platelet Count 215 150 - 450 10e3/uL       ASSESSMENT/PLAN:   [facility] was seen today for physical and ear problem.    Diagnoses and all orders for this visit:    Routine general medical examination at a health care facility  -     Comprehensive metabolic panel (BMP + Alb, Alk Phos, ALT, AST, Total. Bili, TP)    Gastroesophageal reflux disease with esophagitis, unspecified whether hemorrhage  Maintaining on as needed PPI.  Will renew today.  -     pantoprazole (PROTONIX) 20 MG EC tablet; Take 1 tablet (20 mg) by mouth daily  -     CBC with platelets; Future  -     CBC with platelets    Dysfunction of right eustachian tube  There might be some fullness in the right TM consistent with increased pressurization.  I wonder if he has some eustachian tube dysfunction.  I think it is reasonable to try intranasal corticosteroids for couple weeks and see if he improves.  Otherwise he could contact me and we can refer him to ENT if the problem persists.  -     fluticasone (FLONASE) 50 MCG/ACT nasal spray; Spray 1 spray into both nostrils daily    Other orders  -     HEPATITIS B VACCINE ADULT 3 DOSE IM (ENGERIX-B/RECOMBIVAX HB)  -     REVIEW OF HEALTH MAINTENANCE PROTOCOL ORDERS        Patient has been advised of split billing requirements and indicates understanding: Yes      COUNSELING:   Reviewed preventive health counseling, as reflected in patient instructions       Regular exercise       Healthy  diet/nutrition       Immunizations  Vaccinated for: Hepatitis B    He reports that he has never smoked. He has never been exposed to tobacco smoke. He has never used smokeless tobacco.            Prior to immunization administration, verified patients identity using patient s name and date of birth. Please see Immunization Activity for additional information.     Screening Questionnaire for Adult Immunization    Are you sick today?   No   Do you have allergies to medications, food, a vaccine component or latex?   No   Have you ever had a serious reaction after receiving a vaccination?   No   Do you have a long-term health problem with heart, lung, kidney, or metabolic disease (e.g., diabetes), asthma, a blood disorder, no spleen, complement component deficiency, a cochlear implant, or a spinal fluid leak?  Are you on long-term aspirin therapy?   No   Do you have cancer, leukemia, HIV/AIDS, or any other immune system problem?   No   Do you have a parent, brother, or sister with an immune system problem?   No   In the past 3 months, have you taken medications that affect  your immune system, such as prednisone, other steroids, or anticancer drugs; drugs for the treatment of rheumatoid arthritis, Crohn s disease, or psoriasis; or have you had radiation treatments?   No   Have you had a seizure, or a brain or other nervous system problem?   No   During the past year, have you received a transfusion of blood or blood    products, or been given immune (gamma) globulin or antiviral drug?   No   For women: Are you pregnant or is there a chance you could become       pregnant during the next month?   No   Have you received any vaccinations in the past 4 weeks?   No     Immunization questionnaire answers were all negative.      Patient instructed to remain in clinic for 15 minutes afterwards, and to report any adverse reactions.     Screening performed by Paras Bob on 9/19/2023 at 7:07 AM.        Saturnino Walker MD  M  [facility]

## 2023-10-26 ENCOUNTER — OFFICE VISIT (OUTPATIENT)
Dept: PEDIATRICS | Facility: CLINIC | Age: 33
End: 2023-10-26
Payer: COMMERCIAL

## 2023-10-26 VITALS
DIASTOLIC BLOOD PRESSURE: 50 MMHG | OXYGEN SATURATION: 100 % | RESPIRATION RATE: 14 BRPM | BODY MASS INDEX: 23.96 KG/M2 | TEMPERATURE: 97.8 F | SYSTOLIC BLOOD PRESSURE: 100 MMHG | WEIGHT: 167 LBS

## 2023-10-26 DIAGNOSIS — M54.2 NECK PAIN: Primary | ICD-10-CM

## 2023-10-26 PROCEDURE — 99213 OFFICE O/P EST LOW 20 MIN: CPT | Performed by: STUDENT IN AN ORGANIZED HEALTH CARE EDUCATION/TRAINING PROGRAM

## 2023-10-26 RX ORDER — IBUPROFEN 600 MG/1
600 TABLET, FILM COATED ORAL 3 TIMES DAILY
Qty: 15 TABLET | Refills: 1 | Status: SHIPPED | OUTPATIENT
Start: 2023-10-26

## 2023-10-26 NOTE — PATIENT INSTRUCTIONS
Chin tucks: hold for 1 second. 10 per hour.    [email or web address]      Muscle relaxant at night (Zanaflex (tizanidine))        Voltaren (diclofenac) gel

## 2023-10-26 NOTE — PROGRESS NOTES
Assessment & Plan     Neck pain  Suspect upper trapezius strain. Discussed stretching/exercises, NSAIDs, muscle relaxant and trial of chiropractic care. Do not think imaging indicated at this time. Discussed plan of care and reasons to return to clinic or present to the ED (emergency department). Patient and/or guardian in agreement with plan.  - tiZANidine (ZANAFLEX) 4 MG tablet; Take 1 tablet (4 mg) by mouth 3 times daily  - ibuprofen (ADVIL/MOTRIN) 600 MG tablet; Take 1 tablet (600 mg) by mouth 3 times daily  - diclofenac (VOLTAREN) 1 % topical gel; Apply 2 g topically 4 times daily                 Louisa Richards MD  [facility] RACHNA Benitez is a 33 year old, presenting for the following health issues:  Neck Pain      History of Present Illness       Back Pain:  He presents for follow up of back pain. Patient's back pain is a recurring problem.  Location of back pain:  Right upper back and left upper back  Description of back pain: sharp  Back pain spreads: right side of neck and left side of neck    Since patient first noticed back pain, pain is: gradually worsening  Does back pain interfere with his job:  Yes       He eats 2-3 servings of fruits and vegetables daily.He consumes 1 sweetened beverage(s) daily.He exercises with enough effort to increase his heart rate 9 or less minutes per day.  He exercises with enough effort to increase his heart rate 3 or less days per week.   He is taking medications regularly.             Pain History:  When did you first notice your pain? On and off for about a year   How has your pain affected your ability to work? Yes. It effects work,   Where in your body do you have pain? Neck Pain  Onset/Duration: a couple days pain has been worsening  Description:   Location: back of neck into spine  Radiation: none and into spine, slightly shoulder  Progression of Symptoms:  worsening, comes and goes   Accompanying Signs & Symptoms:  Burning, tingling,  prickly sensation in arm(s): No  Numbness in arm(s): No  Weakness in arm(s):  No  Fever: No  Headache: No  Nausea and/or vomiting: No  History:  Previous neck pain: YES  Previous surgery or injections: No  Previous Imaging (MRI,X ray): No  Precipitating or alleviating factors: None  Does movement impact the pain:  YES  Therapies tried and outcome: tylenol, bengay cream. Not much relief  No recent injury or MVA  Had similar episode last year        Objective    /50 (BP Location: Right arm, Patient Position: Sitting, Cuff Size: Adult Regular)   Temp 97.8  F (36.6  C) (Temporal)   Resp 14   Wt 75.8 kg (167 lb)   SpO2 100%   BMI 23.96 kg/m    Body mass index is 23.96 kg/m .  Physical Exam   GENERAL: healthy, alert and no distress  MS: no gross musculoskeletal defects noted, no edema; rotation and flexion/extension of neck limited secondary to pain  NEURO: Normal strength and tone, mentation intact and speech normal

## 2023-11-15 ENCOUNTER — IMMUNIZATION (OUTPATIENT)
Dept: FAMILY MEDICINE | Facility: CLINIC | Age: 33
End: 2023-11-15
Payer: COMMERCIAL

## 2023-11-15 PROCEDURE — 90471 IMMUNIZATION ADMIN: CPT

## 2023-11-15 PROCEDURE — 90686 IIV4 VACC NO PRSV 0.5 ML IM: CPT

## 2024-08-20 ENCOUNTER — PATIENT OUTREACH (OUTPATIENT)
Dept: CARE COORDINATION | Facility: CLINIC | Age: 34
End: 2024-08-20
Payer: COMMERCIAL

## 2024-09-03 ENCOUNTER — PATIENT OUTREACH (OUTPATIENT)
Dept: CARE COORDINATION | Facility: CLINIC | Age: 34
End: 2024-09-03
Payer: COMMERCIAL

## 2024-12-14 ENCOUNTER — HEALTH MAINTENANCE LETTER (OUTPATIENT)
Age: 34
End: 2024-12-14

## 2025-04-22 SDOH — HEALTH STABILITY: PHYSICAL HEALTH: ON AVERAGE, HOW MANY DAYS PER WEEK DO YOU ENGAGE IN MODERATE TO STRENUOUS EXERCISE (LIKE A BRISK WALK)?: 2 DAYS

## 2025-04-22 SDOH — HEALTH STABILITY: PHYSICAL HEALTH: ON AVERAGE, HOW MANY MINUTES DO YOU ENGAGE IN EXERCISE AT THIS LEVEL?: 30 MIN

## 2025-04-22 ASSESSMENT — SOCIAL DETERMINANTS OF HEALTH (SDOH): HOW OFTEN DO YOU GET TOGETHER WITH FRIENDS OR RELATIVES?: TWICE A WEEK

## 2025-04-23 ENCOUNTER — OFFICE VISIT (OUTPATIENT)
Dept: FAMILY MEDICINE | Facility: CLINIC | Age: 35
End: 2025-04-23
Payer: COMMERCIAL

## 2025-04-23 VITALS
TEMPERATURE: 97.9 F | BODY MASS INDEX: 24.05 KG/M2 | OXYGEN SATURATION: 97 % | HEART RATE: 63 BPM | HEIGHT: 70 IN | DIASTOLIC BLOOD PRESSURE: 80 MMHG | WEIGHT: 168 LBS | RESPIRATION RATE: 16 BRPM | SYSTOLIC BLOOD PRESSURE: 128 MMHG

## 2025-04-23 DIAGNOSIS — R33.9 URINE RETENTION: ICD-10-CM

## 2025-04-23 DIAGNOSIS — Z00.00 ROUTINE GENERAL MEDICAL EXAMINATION AT A HEALTH CARE FACILITY: Primary | ICD-10-CM

## 2025-04-23 DIAGNOSIS — K21.00 GASTROESOPHAGEAL REFLUX DISEASE WITH ESOPHAGITIS, UNSPECIFIED WHETHER HEMORRHAGE: ICD-10-CM

## 2025-04-23 LAB
ALBUMIN UR-MCNC: NEGATIVE MG/DL
APPEARANCE UR: CLEAR
BACTERIA #/AREA URNS HPF: ABNORMAL /HPF
BILIRUB UR QL STRIP: NEGATIVE
COLOR UR AUTO: YELLOW
GLUCOSE UR STRIP-MCNC: NEGATIVE MG/DL
HGB UR QL STRIP: ABNORMAL
KETONES UR STRIP-MCNC: NEGATIVE MG/DL
LEUKOCYTE ESTERASE UR QL STRIP: NEGATIVE
NITRATE UR QL: NEGATIVE
PH UR STRIP: 6 [PH] (ref 5–8)
RBC #/AREA URNS AUTO: ABNORMAL /HPF
SP GR UR STRIP: 1.01 (ref 1–1.03)
UROBILINOGEN UR STRIP-ACNC: 0.2 E.U./DL
WBC #/AREA URNS AUTO: ABNORMAL /HPF

## 2025-04-23 PROCEDURE — 81001 URINALYSIS AUTO W/SCOPE: CPT | Performed by: FAMILY MEDICINE

## 2025-04-23 RX ORDER — PANTOPRAZOLE SODIUM 20 MG/1
20 TABLET, DELAYED RELEASE ORAL DAILY PRN
Qty: 90 TABLET | Refills: 1 | Status: SHIPPED | OUTPATIENT
Start: 2025-04-23

## 2025-04-23 SDOH — HEALTH STABILITY: PHYSICAL HEALTH: ON AVERAGE, HOW MANY DAYS PER WEEK DO YOU ENGAGE IN MODERATE TO STRENUOUS EXERCISE (LIKE A BRISK WALK)?: 2 DAYS

## 2025-04-23 SDOH — HEALTH STABILITY: PHYSICAL HEALTH: ON AVERAGE, HOW MANY MINUTES DO YOU ENGAGE IN EXERCISE AT THIS LEVEL?: 30 MIN

## 2025-04-23 ASSESSMENT — SOCIAL DETERMINANTS OF HEALTH (SDOH): HOW OFTEN DO YOU GET TOGETHER WITH FRIENDS OR RELATIVES?: TWICE A WEEK

## 2025-04-23 NOTE — PATIENT INSTRUCTIONS
"Patient Education   L?i Khuyên Tobias Sóc D? Phòng  Ðây là l?i arnold dickinson tôi thu?ng jennifer ra d? giúp m?i zi?i kh?e m?nh. Nhóm tobias sóc c?a quý v? có th? có l?i khuyên c? th? dành riêng cho quý v?. Vui lòng trao d?i v?i nhóm tobias sóc v? redd c?u tobias sóc d? phòng c?a chính quý v?.  L?i s?ng  T?p th? d?c ít nh?t 150 phút m?i tu?n (30 phút m?i ngày, 5 ngày m?t tu?n).  Th?c hi?n các ho?t d?ng armando cu?ng co 2 ngày m?t tu?n. Ði?u này s? giúp quý v? ki?m soát cân n?ng và lida ng?a b?nh t?t.  Không hút thu?c.  Thoa koko ch?ng n?ng d? lida ng?a satya thu da.  Ki?m tra m?c d? radon jace nhà t? 2 d?n 5 nam m?t l?n. Radon là m?t lo?i khí không màu, không mùi có th? gây h?i cho ph?i c?a quý v?. Ð? tìm hi?u thêm, hãy truy c?p [email or web address].mn. và tìm ki?m \"Radon in Homes\" (Radon jace nhà).  Gi? súng không n?p d?n và khóa l?i d? ? grace an toàn redd két s?t ho?c h?m ch?a súng, ho?c s? d?ng khóa súng và c?t chìa khóa di. Luôn khóa và c?t d?n ? ch? riêng. Ð? tìm hi?u thêm, hãy truy c?p dps.mn.[facility] và tìm ki?m \"safe gun storage\" (c?t gi? súng an toàn).  Roberto du?ng  An ít nh?t 5 kh?u ph?n trái cây và shaniqua qu? m?i ngày.  Hãy th? bánh mì lúa mì, g?o l?t và mì ?ng nguyên h?t (thay vì bánh mì tr?ng, g?o và mì ?ng).  N?p d? canxi và vitamin D. Ki?m tra nhãn trên th?c ph?m và hu?ng t?i 100% slade RDA (m?c tiêu th? hàng ngày du?c khuy?n ngh?).  Khám d?nh k?  Khám và v? sinh rang mi?ng 6 tháng m?t l?n.  G?p nhóm tobias sóc s?c kh?e c?a quý v? hàng namd? trao d?i v?:  B?t k? thay d?i nào v? s?c kh?e c?a quý v?.  B?t k? lo?i thu?c nào mà nhóm tobias sóc c?a quý v? dã kê don.  Tobias sóc d? phòng, k? ho?ch hóa shante dình và cách lida ng?a các b?nh mãn tính.  Tiêm ch?ng (v?c-jaylen)   Tiêm phòng HPV (d?n 26 tu?i), n?u quý v? suleman t?ng tiêm lo?i v?c-jaylen này thomas?c dó.  Tiêm phòng viêm kelvin B (d?n 59 tu?i), n?u quý v? suleman t?ng tiêm lo?i v?c-jaylen này thomas?c dó.  Tiêm phòng COVID-19: Tiêm v?c-jaylen này khi d?n h?n.  Tiêm phòng cúm: Tiêm phòng cúm hàng nam.  Tiêm " phòng u?n ván: Tiêm phòng u?n ván 10 nam m?t l?n.  Tiêm phòng ph? c?u khu?n, viêm kelvin A và RSV: Hãy h?i nhóm sherry sóc c?a quý v? xem li?u quý v? có c?n nh?ng jonnie tiêm này hay không d?a trên nguy co ian?m b?nh c?a quý v?.  Tiêm phòng Nyla th?n kinh (dành cho tu?i t? 50 tr? lên).  Xét ronald?m s?c kh?e t?ng quát  Sàng l?c b?nh ti?u du?ng:  B?t d?u t? tu?i 35, Khám sàng l?c b?nh ti?u du?ng ít nh?t 3 nam m?t l?n.  N?u quý v? du?i 35 tu?i, hãy h?i nhóm sherry sóc xem quý v? có nên sàng l?c b?nh ti?u du?ng hay không.  Xét ronald?m cholesterol: T? tu?i 39, b?t d?u xét ronald?m cholesterol 5 nam m?t l?n, ho?c thu?ng xuyên hon n?u du?c khuy?n ngh?.  Ðo m?t d? xuong (DEXA): ? tu?i 50, hãy h?i nhóm sherry sóc c?a quý v? xem quý v? có nên th?c hi?n xét ronald?m này d? phát hi?n b?nh loãng xuong (xuong giòn) hay không.  Viêm kelvin C: Ði xét ronald?m ít nh?t m?t l?n jace d?i.  Khám sàng l?c phình d?ng m?ch ch? b?ng: Trao d?i v?i bác si c?a quý v? v? vi?c th?c hi?n sàng l?c này n?u quý v?:  Ðã t?ng hút thu?c; và  Là nam gi?i v? m?t sinh h?c; và  Jace d? tu?i t? 65 d?n 75.  STI (b?nh ian?m trùng kelsi du?ng tình d?c)  Steve?c 24 tu?i: Hãy h?i nhóm sherry sóc c?a quý v? xem quý v? có nên khám sàng l?c STI hay không.  Renée 24 tu?i: Sàng l?c STI n?u quý v? có nguy co m?c b?nh. Quý v? có nguy co m?c các b?nh STI (deysi g?m c? HIV) n?u:  Quý v? có freddie h? tình d?c tich c?c v?i hon m?t zi?i.  Quý v? không s? d?ng deysi rowe mabry m?i lúc.  Quý v? ho?c b?n tình du?c ch?n doán m?c b?nh ian?m b?nh lây kelsi du?ng tình d?c.  N?u quý v? có nguy co ian?m HIV, hãy h?i lalo thu?c PrEP d? lida ng?a HIV.  Ði xét ronald?m HIV ít nh?t m?t l?n jace d?i, cho dù quý v? có nguy co ian?m HIV hay không.  Xét ronald?m sàng l?c satya thu  Sàng l?c satya thu c? t? cung: N?u quý v? có c? t? cung, hãy b?t d?u làm xét ronald?m sàng l?c satya thu c? t? cung thu?ng xuyên t? tu?i 21. H?u h?t nh?ng zi?i khám sàng l?c thu?ng xuyên v?i k?t qu? bình thu?ng d?u có th? ng?ng khám renée 65 tu?i. Hãy trao d?i v?  v?n d? này v?i bác si c?a quý v?.  Quét satya thu vú (ch?p kathy javad?n vú): N?u quý v? có hay t?ng có vú, hãy b?t d?u ch?p kathy javad?n vú thu?ng xuyên b?t d?u t? tu?i 40. Ðây là quá trình quét d? ki?m tra satya thu vú.  Sàng l?c satya thu d?i tràng: C?n b?t d?u sàng l?c satya thu d?i tràng t? tu?i 45.  Th?c hi?n xét ronald?m n?i soi d?i tràng 10 nam m?t l?n (ho?c thu?ng xuyên hon n?u quý v? có nguy co m?c b?nh) Ho?c, hãy h?i nhà cung c?p c?a quý v? v? các xét ronald?m phân redd xét ronald?m FIT hàng nam ho?c xét ronald?m Cologuard 3 nam m?t l?n.  Ð? tìm hi?u thêm v? các tùy ch?n th? ronald?m c?a quý v?, hãy truy c?p: [email or web address].[email or web address].  Ð? du?c h? tr? jennifer ra lillian?t d?nh, hãy truy c?p: [email or web address].  Xét ronald?m sàng l?c satya thu javad?n ti?n li?t: N?u quý v? có javad?n ti?n li?t và ? d? tu?i t? 55 d?n 69, hãy h?i bác si xem vi?c xét ronald?m sàng l?c satya thu javad?n ti?n li?t hàng nam có dem l?i l?i ích gì cho quý v? hay không.  Sàng l?c satya thu ph?i: N?u quý v? dã t?ng ho?c còn zimmer hút thu?c và t? 50 d?n 80 tu?i, hãy h?i nhóm sherry sóc c?a quý v? xem vi?c sàng l?c satya thu ph?i thu?ng xuyên có phù h?p v?i quý v? hay không.    Ch? nh?m m?c dích vladimir kh?o. Không th? thay th? l?i khuyên c?a nhà cung c?p d?ch v? sherry sóc s?c kh?e c?a quý v?. B?n lillian?n   2023 [facility].   B?o levar m?i lillian?n. Ðu?c dánh giá lâm sàng b?i [facility]. [employer] [id number] - REV 04/24.

## 2025-04-23 NOTE — PROGRESS NOTES
Prior to immunization administration, verified patients identity using patient s name and date of birth. Please see Immunization Activity for additional information.     Screening Questionnaire for Adult Immunization    Are you sick today?   No   Do you have allergies to medications, food, a vaccine component or latex?   No   Have you ever had a serious reaction after receiving a vaccination?   No   Do you have a long-term health problem with heart, lung, kidney, or metabolic disease (e.g., diabetes), asthma, a blood disorder, no spleen, complement component deficiency, a cochlear implant, or a spinal fluid leak?  Are you on long-term aspirin therapy?   No   Do you have cancer, leukemia, HIV/AIDS, or any other immune system problem?   No   Do you have a parent, brother, or sister with an immune system problem?   No   In the past 3 months, have you taken medications that affect  your immune system, such as prednisone, other steroids, or anticancer drugs; drugs for the treatment of rheumatoid arthritis, Crohn s disease, or psoriasis; or have you had radiation treatments?   No   Have you had a seizure, or a brain or other nervous system problem?   No   During the past year, have you received a transfusion of blood or blood    products, or been given immune (gamma) globulin or antiviral drug?   No   For women: Are you pregnant or is there a chance you could become       pregnant during the next month?   No   Have you received any vaccinations in the past 4 weeks?   No     Immunization questionnaire answers were all negative.      Patient instructed to remain in clinic for 15 minutes afterwards, and to report any adverse reactions.     Screening performed by Viktoria Davey CMA on 4/23/2025 at 8:23 AM.

## 2025-04-23 NOTE — PROGRESS NOTES
Preventive Care Visit  [facility]  Saturnino Walker MD, Family Medicine  Apr 23, 2025  {Provider  Link to [facility] :558740}    {PROVIDER CHARTING PREFERENCE:375068}    Subjective   Eli is a 35 year old, presenting for the following:  Physical        4/23/2025     8:20 AM   Additional Questions   Roomed by Tia   Accompanied by Self          HPI  ***   {MA/LPN/RN Pre-Provider Visit Orders- hCG/UA/Strep (Optional):362816}  {SUPERLIST (Optional):382337}  {additonal problems for provider to add (Optional):990974}  Advance Care Planning  {The storyboard will display whether the patient has ACP docs on file. Hover over the Code section in the storyboard to access the ACP documents. :262106}  {(AWV REQUIRED) Advance Care Planning Reviewed:440548}        4/22/2025   General Health   How would you rate your overall physical health? Good    Feel stress (tense, anxious, or unable to sleep) Only a little        Proxy-reported   (!) STRESS CONCERN      4/22/2025   Nutrition   Three or more servings of calcium each day? Yes    Diet: Low fat/cholesterol    How many servings of fruit and vegetables per day? (!) 2-3    How many sweetened beverages each day? 0-1        Proxy-reported         4/22/2025   Exercise   Days per week of moderate/strenous exercise 2 days    Average minutes spent exercising at this level 30 min        Proxy-reported   (!) EXERCISE CONCERN      4/22/2025   Social Factors   Frequency of gathering with friends or relatives Twice a week    Worry food won't last until get money to buy more No    Food not last or not have enough money for food? No    Do you have housing? (Housing is defined as stable permanent housing and does not include staying ouside in a car, in a tent, in an abandoned building, in an overnight shelter, or couch-surfing.) No    Are you worried about losing your housing? No    Lack of transportation? No    Unable to get utilities (heat,electricity)? No    Want help with  "housing or utility concern? No        Proxy-reported   (!) HOUSING CONCERN PRESENT      4/22/2025   Dental   Dentist two times every year? Yes        Proxy-reported         Today's PHQ-2 Score:       4/23/2025     8:32 AM   PHQ-2 ( 1999 [employer])   Q1: Little interest or pleasure in doing things 0    Q2: Feeling down, depressed or hopeless 0    PHQ-2 Score 0    Q1: Little interest or pleasure in doing things Not at all   Q2: Feeling down, depressed or hopeless Not at all   PHQ-2 Score 0       Proxy-reported           4/22/2025   Substance Use   Alcohol more than 3/day or more than 7/wk No    Do you use any other substances recreationally? No        Proxy-reported     Social History     Tobacco Use     Smoking status: Never     Passive exposure: Never     Smokeless tobacco: Never   Vaping Use     Vaping status: Never Used   Substance Use Topics     Alcohol use: Yes     Comment: Not often     Drug use: Never     {Provider  If there are gaps in the social history shown above, please follow the link to update and then refresh the note Link to Social and Substance History :636256}      4/22/2025   STI Screening   New sexual partner(s) since last STI/HIV test? No        Proxy-reported         4/22/2025   Contraception/Family Planning   Questions about contraception or family planning No        Proxy-reported     {Provider  REQUIRED FOR AWV Use the storyboard to review patient history, after sections have been marked as reviewed, refresh note to capture documentation:265118}   Reviewed and updated as needed this visit by Provider   Tobacco      Surg Hx  Fam Hx  Soc Hx Sexual Activity          {HISTORY OPTIONS (Optional):148701}    {ROS Picklists (Optional):401886}     Objective    Exam  /80 (BP Location: Right arm, Patient Position: Sitting, Cuff Size: Adult Regular)   Pulse 63   Temp 97.9  F (36.6  C) (Temporal)   Resp 16   Ht 1.774 m (5' 9.84\")   Wt 76.2 kg (168 lb)   SpO2 97%   BMI 24.21 kg/m   " "  Estimated body mass index is 24.21 kg/m  as calculated from the following:    Height as of this encounter: 1.774 m (5' 9.84\").    Weight as of this encounter: 76.2 kg (168 lb).    Physical Exam  {Exam Choices (Optional):578647}        Signed Electronically by: Saturnino Walker MD  {Email feedback regarding this note to [email or web address]   :838327}  "

## 2025-04-28 ENCOUNTER — PATIENT OUTREACH (OUTPATIENT)
Dept: CARE COORDINATION | Facility: CLINIC | Age: 35
End: 2025-04-28
Payer: COMMERCIAL

## 2025-05-01 ENCOUNTER — LAB (OUTPATIENT)
Dept: LAB | Facility: CLINIC | Age: 35
End: 2025-05-01
Payer: COMMERCIAL

## 2025-05-01 DIAGNOSIS — Z00.00 ROUTINE GENERAL MEDICAL EXAMINATION AT A HEALTH CARE FACILITY: ICD-10-CM

## 2025-05-01 LAB
ALBUMIN SERPL BCG-MCNC: 4.8 G/DL (ref 3.5–5.2)
ALP SERPL-CCNC: 60 U/L (ref 40–150)
ALT SERPL W P-5'-P-CCNC: 33 U/L (ref 0–70)
ANION GAP SERPL CALCULATED.3IONS-SCNC: 10 MMOL/L (ref 7–15)
AST SERPL W P-5'-P-CCNC: 29 U/L (ref 0–45)
BILIRUB SERPL-MCNC: 0.5 MG/DL
BUN SERPL-MCNC: 16.1 MG/DL (ref 6–20)
CALCIUM SERPL-MCNC: 9.5 MG/DL (ref 8.8–10.4)
CHLORIDE SERPL-SCNC: 105 MMOL/L (ref 98–107)
CHOLEST SERPL-MCNC: 212 MG/DL
CREAT SERPL-MCNC: 1.03 MG/DL (ref 0.67–1.17)
EGFRCR SERPLBLD CKD-EPI 2021: >90 ML/MIN/1.73M2
ERYTHROCYTE [DISTWIDTH] IN BLOOD BY AUTOMATED COUNT: 11.9 % (ref 10–15)
EST. AVERAGE GLUCOSE BLD GHB EST-MCNC: 111 MG/DL
FASTING STATUS PATIENT QL REPORTED: YES
FASTING STATUS PATIENT QL REPORTED: YES
GLUCOSE SERPL-MCNC: 91 MG/DL (ref 70–99)
HBA1C MFR BLD: 5.5 % (ref 0–5.6)
HCO3 SERPL-SCNC: 26 MMOL/L (ref 22–29)
HCT VFR BLD AUTO: 47.5 % (ref 40–53)
HDLC SERPL-MCNC: 57 MG/DL
HGB BLD-MCNC: 16.3 G/DL (ref 13.3–17.7)
LDLC SERPL CALC-MCNC: 131 MG/DL
MCH RBC QN AUTO: 29.4 PG (ref 26.5–33)
MCHC RBC AUTO-ENTMCNC: 34.3 G/DL (ref 31.5–36.5)
MCV RBC AUTO: 86 FL (ref 78–100)
NONHDLC SERPL-MCNC: 155 MG/DL
PLATELET # BLD AUTO: 229 10E3/UL (ref 150–450)
POTASSIUM SERPL-SCNC: 4.4 MMOL/L (ref 3.4–5.3)
PROT SERPL-MCNC: 7.5 G/DL (ref 6.4–8.3)
RBC # BLD AUTO: 5.55 10E6/UL (ref 4.4–5.9)
SODIUM SERPL-SCNC: 141 MMOL/L (ref 135–145)
TRIGL SERPL-MCNC: 121 MG/DL
WBC # BLD AUTO: 6.1 10E3/UL (ref 4–11)